# Patient Record
Sex: FEMALE | Race: WHITE | NOT HISPANIC OR LATINO | Employment: OTHER | URBAN - METROPOLITAN AREA
[De-identification: names, ages, dates, MRNs, and addresses within clinical notes are randomized per-mention and may not be internally consistent; named-entity substitution may affect disease eponyms.]

---

## 2024-06-04 ENCOUNTER — HOSPITAL ENCOUNTER (OUTPATIENT)
Dept: RADIOLOGY | Facility: CLINIC | Age: 89
Discharge: HOME | End: 2024-06-04
Payer: MEDICARE

## 2024-06-04 DIAGNOSIS — J45.30 MILD PERSISTENT ASTHMA, UNCOMPLICATED (HHS-HCC): ICD-10-CM

## 2024-06-04 PROCEDURE — 71046 X-RAY EXAM CHEST 2 VIEWS: CPT | Performed by: RADIOLOGY

## 2024-06-04 PROCEDURE — 71046 X-RAY EXAM CHEST 2 VIEWS: CPT

## 2025-03-31 ENCOUNTER — APPOINTMENT (OUTPATIENT)
Dept: RADIOLOGY | Facility: HOSPITAL | Age: OVER 89
End: 2025-03-31
Payer: MEDICARE

## 2025-03-31 ENCOUNTER — HOSPITAL ENCOUNTER (EMERGENCY)
Facility: HOSPITAL | Age: OVER 89
Discharge: HOME | End: 2025-03-31
Attending: EMERGENCY MEDICINE
Payer: MEDICARE

## 2025-03-31 VITALS
DIASTOLIC BLOOD PRESSURE: 87 MMHG | BODY MASS INDEX: 34.36 KG/M2 | OXYGEN SATURATION: 98 % | SYSTOLIC BLOOD PRESSURE: 195 MMHG | HEART RATE: 68 BPM | RESPIRATION RATE: 18 BRPM | TEMPERATURE: 96.8 F | HEIGHT: 60 IN | WEIGHT: 175 LBS

## 2025-03-31 DIAGNOSIS — S52.591A OTHER CLOSED FRACTURE OF DISTAL END OF RIGHT RADIUS, INITIAL ENCOUNTER: Primary | ICD-10-CM

## 2025-03-31 PROCEDURE — 73110 X-RAY EXAM OF WRIST: CPT | Mod: RIGHT SIDE | Performed by: RADIOLOGY

## 2025-03-31 PROCEDURE — 99284 EMERGENCY DEPT VISIT MOD MDM: CPT | Performed by: EMERGENCY MEDICINE

## 2025-03-31 PROCEDURE — 73130 X-RAY EXAM OF HAND: CPT | Mod: RIGHT SIDE | Performed by: RADIOLOGY

## 2025-03-31 PROCEDURE — 73110 X-RAY EXAM OF WRIST: CPT | Mod: RT

## 2025-03-31 PROCEDURE — 73590 X-RAY EXAM OF LOWER LEG: CPT | Mod: RT

## 2025-03-31 PROCEDURE — 2500000001 HC RX 250 WO HCPCS SELF ADMINISTERED DRUGS (ALT 637 FOR MEDICARE OP)

## 2025-03-31 PROCEDURE — 73130 X-RAY EXAM OF HAND: CPT | Mod: RT

## 2025-03-31 PROCEDURE — 2500000005 HC RX 250 GENERAL PHARMACY W/O HCPCS

## 2025-03-31 PROCEDURE — 25605 CLTX DST RDL FX/EPHYS SEP W/: CPT

## 2025-03-31 PROCEDURE — 2500000004 HC RX 250 GENERAL PHARMACY W/ HCPCS (ALT 636 FOR OP/ED)

## 2025-03-31 PROCEDURE — 73590 X-RAY EXAM OF LOWER LEG: CPT | Mod: RIGHT SIDE | Performed by: RADIOLOGY

## 2025-03-31 RX ORDER — IBUPROFEN 400 MG/1
400 TABLET ORAL ONCE
Status: COMPLETED | OUTPATIENT
Start: 2025-03-31 | End: 2025-03-31

## 2025-03-31 RX ORDER — BUPIVACAINE HYDROCHLORIDE 5 MG/ML
20 INJECTION, SOLUTION EPIDURAL; INTRACAUDAL; PERINEURAL ONCE
Status: COMPLETED | OUTPATIENT
Start: 2025-03-31 | End: 2025-03-31

## 2025-03-31 RX ORDER — LIDOCAINE HYDROCHLORIDE 10 MG/ML
20 INJECTION, SOLUTION INFILTRATION; PERINEURAL ONCE
Status: COMPLETED | OUTPATIENT
Start: 2025-03-31 | End: 2025-03-31

## 2025-03-31 RX ADMIN — LIDOCAINE HYDROCHLORIDE 20 ML: 10 INJECTION, SOLUTION INFILTRATION; PERINEURAL at 17:40

## 2025-03-31 RX ADMIN — IBUPROFEN 400 MG: 400 TABLET, FILM COATED ORAL at 17:21

## 2025-03-31 RX ADMIN — Medication 1 APPLICATION: at 17:40

## 2025-03-31 RX ADMIN — BUPIVACAINE HYDROCHLORIDE 100 MG: 5 INJECTION, SOLUTION EPIDURAL; INTRACAUDAL; PERINEURAL at 17:40

## 2025-03-31 ASSESSMENT — PAIN SCALES - GENERAL
PAINLEVEL_OUTOF10: 3
PAINLEVEL_OUTOF10: 8
PAINLEVEL_OUTOF10: 7

## 2025-03-31 ASSESSMENT — COLUMBIA-SUICIDE SEVERITY RATING SCALE - C-SSRS
6. HAVE YOU EVER DONE ANYTHING, STARTED TO DO ANYTHING, OR PREPARED TO DO ANYTHING TO END YOUR LIFE?: NO
1. IN THE PAST MONTH, HAVE YOU WISHED YOU WERE DEAD OR WISHED YOU COULD GO TO SLEEP AND NOT WAKE UP?: NO
2. HAVE YOU ACTUALLY HAD ANY THOUGHTS OF KILLING YOURSELF?: NO

## 2025-03-31 ASSESSMENT — LIFESTYLE VARIABLES
TOTAL SCORE: 0
HAVE PEOPLE ANNOYED YOU BY CRITICIZING YOUR DRINKING: NO
EVER FELT BAD OR GUILTY ABOUT YOUR DRINKING: NO
HAVE YOU EVER FELT YOU SHOULD CUT DOWN ON YOUR DRINKING: NO
EVER HAD A DRINK FIRST THING IN THE MORNING TO STEADY YOUR NERVES TO GET RID OF A HANGOVER: NO

## 2025-03-31 ASSESSMENT — PAIN - FUNCTIONAL ASSESSMENT: PAIN_FUNCTIONAL_ASSESSMENT: 0-10

## 2025-03-31 ASSESSMENT — PAIN DESCRIPTION - LOCATION
LOCATION: WRIST
LOCATION: WRIST

## 2025-03-31 ASSESSMENT — PAIN DESCRIPTION - PAIN TYPE: TYPE: ACUTE PAIN

## 2025-03-31 ASSESSMENT — PAIN DESCRIPTION - ORIENTATION: ORIENTATION: RIGHT

## 2025-03-31 NOTE — ED PROVIDER NOTES
HPI   Chief Complaint   Patient presents with    Fall    Wrist Injury     Pt states slipped on grass and injured r wrist        Chiara Garces is a 89 year old female with PMH of diabetes and HTN presenting to the ED with right wrist pain after fall. Patient states that she was at home taking the garbage in when she experienced a mechanical fall after turning around on the grass. She states that she was able to brace her fall with her right hand and then land onto her bottom.  Denies presyncopal symptoms, chest pain, shortness of breath.  Denies head injury, LOC, anticoagulation use.  Main complaint is right wrist pain.  Her daughter was able to bring her in and supply her with an ice pack to minimize swelling. Patient states that she is able to move all of her fingers but it causes pain.  Denies any numbness or tingling.  Denies any other acute injuries, back pain, neck pain              Patient History   Past Medical History:   Diagnosis Date    Diabetes mellitus (Multi)     Disease of thyroid gland     Hypertension      Past Surgical History:   Procedure Laterality Date    CHOLECYSTECTOMY      JOINT REPLACEMENT       No family history on file.  Social History     Tobacco Use    Smoking status: Never    Smokeless tobacco: Not on file   Substance Use Topics    Alcohol use: Never    Drug use: Never       Physical Exam   ED Triage Vitals   Temperature Pulse Respirations BP   03/31/25 1624 -- 03/31/25 1624 03/31/25 1630   36.5 °C (97.7 °F)  17 133/64      Pulse Ox Temp Source Heart Rate Source Patient Position   03/31/25 1624 03/31/25 1624 03/31/25 1624 03/31/25 1624   98 % Temporal Monitor Sitting      BP Location FiO2 (%)     03/31/25 1624 --     Left arm        Physical Exam  Constitutional:       Appearance: Normal appearance.   HENT:      Head: Normocephalic and atraumatic.   Musculoskeletal:      Comments: No midline C, T, L-spine tenderness    Right wrist with obvious deformity, no open wounds, able to flex and  extend her digits without difficulty.  Gross swelling noted along the radial aspect of her wrist.  Pulses intact, cap refill less than 2 seconds, sensation intact   Neurological:      General: No focal deficit present.      Mental Status: She is alert and oriented to person, place, and time.   Psychiatric:         Mood and Affect: Mood normal.         Behavior: Behavior normal.           ED Course & MDM   ED Course as of 03/31/25 2005   Mon Mar 31, 2025   1747 XR hand right 3+ views  Comminuted distal radial fracture with moderate impaction and mild  displacement. Questionable ulnar styloid fracture. Soft tissue edema   [ML]   1914 XR wrist right 3+ views  1. Distal right radial metaphyseal fracture, as above.   [ML]      ED Course User Index  [ML] Deborah Whaley PA-C         Diagnoses as of 03/31/25 2005   Other closed fracture of distal end of right radius, initial encounter                 No data recorded     Raheem Coma Scale Score: 15 (03/31/25 1632 : Mayra Amado RN)                           Medical Decision Making  89-year-old female presenting today for mechanical fall.  Denies any syncope or presyncopal symptoms causing the fall.  No head injury, LOC, anticoagulation use.  On exam, no midline C, T, L-spine tenderness. Right wrist with obvious deformity, no open wounds, able to flex and extend her digits without difficulty.  Gross swelling noted along the radial aspect of her wrist.  Pulses intact, cap refill less than 2 seconds, sensation intact.  X-rays obtained of right wrist and hand.    X-ray is revealing a distal radial fracture with moderate impaction and displacement.  Attempted hematoma block of patient which patient tolerated well.  Placed it in a sugar-tong splint.  Repeat imaging showing similar appearing fracture.  No new fracture or dislocation identified.  Repeat x-ray visualized by me and ED attending, appears mildly better.  Discussed close follow-up with orthopedic surgery.   Patient declined any prescriptions for pain management as she has ibuprofen and Tylenol at home.  Has already scheduled appointment with orthopedic surgery tomorrow for evaluation.        Procedure  Orthopaedic Injury Treatment - Fracture    Performed by: Deborah Whaley PA-C  Authorized by: Bijan Marie DO    Consent:     Consent obtained:  Verbal and written    Consent given by:  Patient    Risks, benefits, and alternatives were discussed: yes      Risks discussed:  Nerve damage, pain and vascular damage    Alternatives discussed:  No treatment, delayed treatment and alternative treatment  Universal protocol:     Procedure explained and questions answered to patient or proxy's satisfaction: yes      Relevant documents present and verified: yes      Imaging studies available: yes      Immediately prior to procedure, a time out was called: yes      Patient identity confirmed:  Verbally with patient  Injury:     Injury location:  Forearm    Forearm injury location:  R forearm    Forearm fracture type: distal radial    Pre-procedure details:     Distal neurologic exam:  Normal    Distal perfusion: distal pulses strong      Range of motion: normal    Sedation:     Sedation type:  None  Procedure details:     Manipulation performed: yes      Immobilization:  Splint    Splint type:  Sugar tong  Post-procedure details:     Distal neurologic exam:  Normal    Distal perfusion: distal pulses strong      Procedure completion:  Tolerated       Deborah Whaley PA-C  03/31/25 2016

## 2025-03-31 NOTE — DISCHARGE INSTRUCTIONS
Follow-up with orthopedic surgery  If your fingertips get numb or blue, please take off the splint and come to the ER immediately  Do not get the splint wet, if you need to shower, please put in a plastic bag.    Take Tylenol or ibuprofen as needed for pain.  If you feel like you are pain is not tolerated with these medications, please discuss with orthopedic surgery with a medication stronger

## 2025-04-01 ENCOUNTER — OFFICE VISIT (OUTPATIENT)
Dept: ORTHOPEDIC SURGERY | Facility: CLINIC | Age: OVER 89
End: 2025-04-01
Payer: MEDICARE

## 2025-04-01 ENCOUNTER — HOSPITAL ENCOUNTER (OUTPATIENT)
Dept: RADIOLOGY | Facility: CLINIC | Age: OVER 89
Discharge: HOME | End: 2025-04-01
Payer: MEDICARE

## 2025-04-01 DIAGNOSIS — S52.591A OTHER CLOSED FRACTURE OF DISTAL END OF RIGHT RADIUS, INITIAL ENCOUNTER: ICD-10-CM

## 2025-04-01 PROCEDURE — 25600 CLTX DST RDL FX/EPHYS SEP WO: CPT | Performed by: ORTHOPAEDIC SURGERY

## 2025-04-01 PROCEDURE — 73110 X-RAY EXAM OF WRIST: CPT | Mod: RIGHT SIDE | Performed by: ORTHOPAEDIC SURGERY

## 2025-04-01 PROCEDURE — 73110 X-RAY EXAM OF WRIST: CPT | Mod: RT

## 2025-04-01 PROCEDURE — 1157F ADVNC CARE PLAN IN RCRD: CPT | Performed by: ORTHOPAEDIC SURGERY

## 2025-04-01 PROCEDURE — 99214 OFFICE O/P EST MOD 30 MIN: CPT | Mod: 25 | Performed by: ORTHOPAEDIC SURGERY

## 2025-04-01 PROCEDURE — 99204 OFFICE O/P NEW MOD 45 MIN: CPT | Performed by: ORTHOPAEDIC SURGERY

## 2025-04-01 NOTE — PROGRESS NOTES
History present illness: Patient presents today status post mechanical fall injuring the right wrist yesterday.  She was seen in the emergency department at [facility].  She was splinted and told to follow-up with orthopedics.  She presents today for evaluation and treatment.      Past medical history: The patient's past medical history, family history, social history, and review of systems were documented on the patient medical intake.  The updated data was reviewed in the electronic medical record.  History is negative except otherwise stated in history of present illness.        Physical examination:  General: Alert and oriented to person, place, and time.  No acute distress and breathing comfortably: Pleasant and cooperative with examination.  HEENT: Head is normocephalic and atraumatic.  Neck: Supple, no visible swelling.  Cardiovascular: No palpable tachycardia  Lungs: No audible wheezing or labored breathing  Abdomen: Nondistended.  Extremities: Evaluation of the right upper extremity finds the patient had palpable radial artery at the wrist with brisk capillary refill to all digits.  Patient has intact sensation to axillary radial median and ulnar nerves.  There are no open wounds.  There are no signs of infection.  There is no evidence of lymphedema or lymphatic streaking.  The patient has supple compartments to right arm forearm and hand.  Tenderness swelling ecchymosis notable to right wrist.      Radiology: Comminuted displaced intra-articular fracture right distal radius.      Assessment: Comminuted displaced intra-articular fracture right distal radius      Plan: Treatment options were discussed.  We talked about operative and nonoperative strategies.  Patient elects to proceed forth with long-arm cast immobilization strict nonweightbearing ice elevation in 1 week follow-up for x-rays in the cast.  Fiberglass casting material was used to create well-padded well molded right long-arm cast.  Cast care  instructions were given.        Procedure:

## 2025-04-08 ENCOUNTER — HOSPITAL ENCOUNTER (OUTPATIENT)
Dept: RADIOLOGY | Facility: CLINIC | Age: OVER 89
Discharge: HOME | End: 2025-04-08
Payer: MEDICARE

## 2025-04-08 ENCOUNTER — OFFICE VISIT (OUTPATIENT)
Dept: ORTHOPEDIC SURGERY | Facility: CLINIC | Age: OVER 89
End: 2025-04-08
Payer: MEDICARE

## 2025-04-08 DIAGNOSIS — S52.591A OTHER CLOSED FRACTURE OF DISTAL END OF RIGHT RADIUS, INITIAL ENCOUNTER: Primary | ICD-10-CM

## 2025-04-08 DIAGNOSIS — S52.591A OTHER CLOSED FRACTURE OF DISTAL END OF RIGHT RADIUS, INITIAL ENCOUNTER: ICD-10-CM

## 2025-04-08 PROCEDURE — 1159F MED LIST DOCD IN RCRD: CPT | Performed by: ORTHOPAEDIC SURGERY

## 2025-04-08 PROCEDURE — 73110 X-RAY EXAM OF WRIST: CPT | Mod: RIGHT SIDE | Performed by: ORTHOPAEDIC SURGERY

## 2025-04-08 PROCEDURE — 99024 POSTOP FOLLOW-UP VISIT: CPT | Performed by: ORTHOPAEDIC SURGERY

## 2025-04-08 PROCEDURE — 99211 OFF/OP EST MAY X REQ PHY/QHP: CPT | Performed by: ORTHOPAEDIC SURGERY

## 2025-04-08 PROCEDURE — 1157F ADVNC CARE PLAN IN RCRD: CPT | Performed by: ORTHOPAEDIC SURGERY

## 2025-04-08 PROCEDURE — 73110 X-RAY EXAM OF WRIST: CPT | Mod: RT

## 2025-04-08 PROCEDURE — 1036F TOBACCO NON-USER: CPT | Performed by: ORTHOPAEDIC SURGERY

## 2025-04-08 RX ORDER — SITAGLIPTIN 100 MG/1
100 TABLET, FILM COATED ORAL
COMMUNITY

## 2025-04-08 RX ORDER — GLIPIZIDE AND METFORMIN HCL 2.5; 5 MG/1; MG/1
1 TABLET, FILM COATED ORAL
COMMUNITY

## 2025-04-08 RX ORDER — LOSARTAN POTASSIUM 50 MG/1
50 TABLET ORAL DAILY
COMMUNITY

## 2025-04-08 RX ORDER — PANTOPRAZOLE SODIUM 40 MG/1
40 TABLET, DELAYED RELEASE ORAL 2 TIMES DAILY
COMMUNITY
Start: 2024-02-05

## 2025-04-08 RX ORDER — LEVOTHYROXINE SODIUM 125 UG/1
125 TABLET ORAL DAILY
COMMUNITY

## 2025-04-08 RX ORDER — EMPAGLIFLOZIN 25 MG/1
1 TABLET, FILM COATED ORAL
COMMUNITY
Start: 2024-09-17

## 2025-04-08 RX ORDER — AMOXICILLIN 875 MG/1
875 TABLET, FILM COATED ORAL 2 TIMES DAILY
COMMUNITY

## 2025-04-08 RX ORDER — ALPRAZOLAM 0.25 MG/1
0.25 TABLET ORAL 2 TIMES DAILY PRN
COMMUNITY
Start: 2025-03-20

## 2025-04-08 RX ORDER — DILTIAZEM HYDROCHLORIDE 60 MG/1
TABLET, FILM COATED ORAL
COMMUNITY
Start: 2024-06-04

## 2025-04-08 NOTE — PROGRESS NOTES
Patient presents today for ongoing care of her comminuted intra-articular displaced right distal radius fracture.  She has been compliant with weightbearing restrictions.  She describes numbness and tingling to the median nerve distribution to the right hand.  On physical exam her swelling to the digits along with active motion is improved.  X-rays demonstrate the fracture described above without interval displacement.  Recommendations were made for 3 additional weeks of the current long-arm cast.  Patient is agreeable.  Persist with nonweightbearing.  Follow-up in 3 weeks for x-rays of right wrist out of cast.  Convert to short arm cast versus removable Velcro wrist splint at that time.

## 2025-04-29 ENCOUNTER — HOSPITAL ENCOUNTER (OUTPATIENT)
Dept: RADIOLOGY | Facility: CLINIC | Age: OVER 89
Discharge: HOME | End: 2025-04-29
Payer: MEDICARE

## 2025-04-29 ENCOUNTER — OFFICE VISIT (OUTPATIENT)
Dept: ORTHOPEDIC SURGERY | Facility: CLINIC | Age: OVER 89
End: 2025-04-29
Payer: MEDICARE

## 2025-04-29 DIAGNOSIS — S52.591A OTHER CLOSED FRACTURE OF DISTAL END OF RIGHT RADIUS, INITIAL ENCOUNTER: ICD-10-CM

## 2025-04-29 PROCEDURE — 29085 APPL CAST HAND&LWR FOREARM: CPT | Performed by: ORTHOPAEDIC SURGERY

## 2025-04-29 PROCEDURE — 99024 POSTOP FOLLOW-UP VISIT: CPT | Performed by: ORTHOPAEDIC SURGERY

## 2025-04-29 PROCEDURE — 73110 X-RAY EXAM OF WRIST: CPT | Mod: RT

## 2025-04-29 PROCEDURE — 99213 OFFICE O/P EST LOW 20 MIN: CPT | Mod: 25 | Performed by: ORTHOPAEDIC SURGERY

## 2025-04-29 PROCEDURE — 1157F ADVNC CARE PLAN IN RCRD: CPT | Performed by: ORTHOPAEDIC SURGERY

## 2025-04-29 NOTE — PROGRESS NOTES
4/29/2025    Chief Complaint   Patient presents with    Right Wrist - Follow-up     Distal radius fx  DOI: 3/31/25  Xrays today        History of Present Illness:  Patient Chiara Garces , 89 y.o. female, presents today, 4/29/2025, for evaluation of right wrist  distal radius fracture, 4 weeks out from nonoperative management.  She has been immobilized in long-arm cast.  She states she has been largely compliant with nonweightbearing restrictions but has had to use the hand for some light activities if she lives alone. .         Review of Systems:   GENERAL: Negative  GI: Negative  MUSCULOSKELETAL: See HPI  SKIN: Negative  NEURO:  Negative     Physical Exam:  GENERAL:  Alert and oriented to person, place, and time.  No acute distress and breathing comfortably; pleasant and cooperative with the examination.  HEENT:  Head is normocephalic and atraumatic.  NECK:  Supple, no visible swelling.  CARDIOVASCULAR:  No palpable tachycardia.  LUNGS:  No audible wheezing or labored breathing.  ABDOMEN:  Nondistended.  Extremities: Evaluation of the right upper extremity finds the patient to have a palpable radial artery at the wrist with brisk capillary refill to all digits. The patient has intact sensorium to axillary, radial, median and ulnar nerves. There are no open wounds. There are no signs of infection. There is no evidence of lymphedema or lymphatic streaking. The patient has supple compartments of the right arm, forearm and hand.  Tender palpation over distal radius fracture site.  Mild swelling of the digits but overall good range of motion with flexion extension maintained.  She has pain with flexion extension arc across the wrist.  She can comfortably extend the elbow lacking about 15 degrees but comfortably flex about 125 degrees.     Imaging/Test Results:  3 views the right wrist show evidence of displaced right distal radius fracture with continued adequate alignment throughout all 3 planes.  Some early  healing and increased callus formation is noted.     Assessment:  Right distal radius fracture, 4 weeks out nonoperative management.     Plan:  Continue with nonweightbearing restrictions of the right upper extremity.  We discussed transitioning to short arm cast today for additional support.  Patient was amenable to this.  Follow-up in 3 weeks for repeat clinical and radiographic exam, x-rays 3 views of right wrist out of cast at next visit.  All questions answered today's visit.    In a face to face encounter, I performed a history and physical examination, discussed pertinent diagnostic studies if indicated, and discussed diagnosis and management strategies with both the patient and the mid-level provider. I reviewed the mid-level's note and agree with the documented findings and plan of care.  Patient presents today for evaluation of right distal radius fracture.  X-rays demonstrate partial bony bridging.  Substantial shortening and loss of radial height and radial inclination.  Continued tenderness over the fracture.  Recommendations were made for application of short arm cast.  Fiberglass casting material was used to fabricate well-padded well molded right short arm cast.  3-week follow-up for x-rays of right wrist out of cast.  Patient is agreeable with this strategy.

## 2025-05-20 ENCOUNTER — HOSPITAL ENCOUNTER (OUTPATIENT)
Dept: RADIOLOGY | Facility: CLINIC | Age: OVER 89
Discharge: HOME | End: 2025-05-20
Payer: MEDICARE

## 2025-05-20 ENCOUNTER — OFFICE VISIT (OUTPATIENT)
Dept: ORTHOPEDIC SURGERY | Facility: CLINIC | Age: OVER 89
End: 2025-05-20
Payer: MEDICARE

## 2025-05-20 DIAGNOSIS — S52.591A OTHER CLOSED FRACTURE OF DISTAL END OF RIGHT RADIUS, INITIAL ENCOUNTER: ICD-10-CM

## 2025-05-20 DIAGNOSIS — S52.591A OTHER CLOSED FRACTURE OF DISTAL END OF RIGHT RADIUS, INITIAL ENCOUNTER: Primary | ICD-10-CM

## 2025-05-20 PROCEDURE — 73110 X-RAY EXAM OF WRIST: CPT | Mod: RT

## 2025-05-20 PROCEDURE — 73110 X-RAY EXAM OF WRIST: CPT | Mod: RIGHT SIDE | Performed by: ORTHOPAEDIC SURGERY

## 2025-05-20 PROCEDURE — 99024 POSTOP FOLLOW-UP VISIT: CPT | Performed by: ORTHOPAEDIC SURGERY

## 2025-05-20 PROCEDURE — L3908 WHO COCK-UP NONMOLDE PRE OTS: HCPCS | Performed by: ORTHOPAEDIC SURGERY

## 2025-05-20 PROCEDURE — 99213 OFFICE O/P EST LOW 20 MIN: CPT | Performed by: ORTHOPAEDIC SURGERY

## 2025-05-20 NOTE — PROGRESS NOTES
5/20/2025    Chief Complaint   Patient presents with    Right Wrist - Follow-up     Distal radius fx  DOI: 3/31/25  Xrays today XOP         History of Present Illness:  Patient Chiara Garces , 89 y.o. female, presents today, 5/20/2025, for evaluation of right wrist distal radius fracture, 7 weeks out nonoperative management.  Patient comes in today with her son helps provide from history.  She has been attempting to avoid weightbearing and activities with the upper extremity is much as possible.  We transition to short arm cast at last visit and they are inquiring about moving to a removable brace.  Overall she feels pain and discomfort are decreasing and functionality is increasing.         Review of Systems:   GENERAL: Negative  GI: Negative  MUSCULOSKELETAL: See HPI  SKIN: Negative  NEURO:  Negative     Physical Exam:  GENERAL:  Alert and oriented to person, place, and time.  No acute distress and breathing comfortably; pleasant and cooperative with the examination.  HEENT:  Head is normocephalic and atraumatic.  NECK:  Supple, no visible swelling.  CARDIOVASCULAR:  No palpable tachycardia.  LUNGS:  No audible wheezing or labored breathing.  ABDOMEN:  Nondistended.  Extremities: Evaluation of the right upper extremity finds the patient to have a palpable radial artery at the wrist with brisk capillary refill to all digits. The patient has intact sensorium to axillary, radial, median and ulnar nerves. There are no open wounds. There are no signs of infection. There is no evidence of lymphedema or lymphatic streaking. The patient has supple compartments of the right arm, forearm and hand.  No tenderness palpation of the fracture site today.  She demonstrates appropriate range of motion flexion extension of the digits with full composite fist.     Imaging/Test Results:  3 views of the right wrist taken in the office today show evidence of distal radius fracture with malunion.  Increased healing callus  formation is noted.  Continued good alignment throughout all 3 planes.     Assessment:  Right distal radius fracture, 7 weeks out nonoperative management.     Plan:  Recommend transition to splint for comfort.  She can wear this over the next couple weeks and then wean from it.  She can begin slowly progressing to weightbearing activities as tolerated did.  Follow-up with our office in 6 weeks if she has any lingering concerns, x-rays upon return 3 views of the right wrist.  If symptoms resolve in the interim she may call to cancel.  All questions answered today's visit.    Caro Hodge PA-C

## 2025-07-01 ENCOUNTER — OFFICE VISIT (OUTPATIENT)
Dept: ORTHOPEDIC SURGERY | Facility: CLINIC | Age: OVER 89
End: 2025-07-01
Payer: MEDICARE

## 2025-07-01 ENCOUNTER — HOSPITAL ENCOUNTER (OUTPATIENT)
Dept: RADIOLOGY | Facility: CLINIC | Age: OVER 89
Discharge: HOME | End: 2025-07-01
Payer: MEDICARE

## 2025-07-01 DIAGNOSIS — S52.591A OTHER CLOSED FRACTURE OF DISTAL END OF RIGHT RADIUS, INITIAL ENCOUNTER: ICD-10-CM

## 2025-07-01 PROCEDURE — 73110 X-RAY EXAM OF WRIST: CPT | Mod: RT

## 2025-07-01 PROCEDURE — 1036F TOBACCO NON-USER: CPT | Performed by: ORTHOPAEDIC SURGERY

## 2025-07-01 PROCEDURE — 99213 OFFICE O/P EST LOW 20 MIN: CPT | Performed by: ORTHOPAEDIC SURGERY

## 2025-07-01 PROCEDURE — 99212 OFFICE O/P EST SF 10 MIN: CPT | Performed by: ORTHOPAEDIC SURGERY

## 2025-07-01 PROCEDURE — 1159F MED LIST DOCD IN RCRD: CPT | Performed by: ORTHOPAEDIC SURGERY

## 2025-07-01 NOTE — PROGRESS NOTES
7/1/2025    Chief Complaint   Patient presents with    Right Wrist - Follow-up     Distal radius fx  DOI: 3/31/25  Xrays today         History of Present Illness:  Patient Chiara Garces , 89 y.o. female, presents today, 7/1/2025, for evaluation of right wrist distal radius fracture, 3 months out from nonoperative management.  Here today with her son who helps providing from history.  She is still been wearing the splint.  She states she is transitioning away from it at home more to Ace bandage.  She continues to have persistence of ulnar-sided wrist pain.  Overall she feels that the hand is sore and weak and she cannot fully grasp and squeeze things like she could before.  No new injury or trauma reported.         Review of Systems:   GENERAL: Negative  GI: Negative  MUSCULOSKELETAL: See HPI  SKIN: Negative  NEURO:  Negative     Physical Exam:  GENERAL:  Alert and oriented to person, place, and time.  No acute distress and breathing comfortably; pleasant and cooperative with the examination.  HEENT:  Head is normocephalic and atraumatic.  NECK:  Supple, no visible swelling.  CARDIOVASCULAR:  No palpable tachycardia.  LUNGS:  No audible wheezing or labored breathing.  ABDOMEN:  Nondistended.  Extremities: Evaluation of the right upper extremity finds the patient to have a palpable radial artery at the wrist with brisk capillary refill to all digits. The patient has intact sensorium to axillary, radial, median and ulnar nerves. There are no open wounds. There are no signs of infection. There is no evidence of lymphedema or lymphatic streaking. The patient has supple compartments of the right arm, forearm and hand.  She is nontender to palpation of the fracture site of the right distal radius.  She does have tenderness diffusely over the ulnar aspect of the wrist and the DRUJ.  There is no 1 location of discrete tenderness however.  She has functional arc of motion with flexion extension and pronosupination of  the wrist.     Imaging/Test Results:  Radiographs show evidence of healed distal radius fracture with continued adequate alignment.     Assessment:  Persistent right wrist pain 3 months out from nonoperative management of healed right distal radius fracture with concern for continued discomfort secondary to ulnar-sided wrist pain.     Plan:  Treatment options were reviewed.  We recommended formal therapy referral to work on endurance and strengthening.  She declines this.  She wants to continue with home program.  We recommend she wean from immobilization and bracing.  We discussed the phenomenon of ulnar-sided wrist pain after distal radius fracture.  We discussed that we could consider injection at some point if symptoms persist but for now we recommend simple observation and follow-up again in 3 months for repeat clinical exam, if symptoms persist we will get x-rays 3 views of the right wrist upon return.  All questions answered today's visit.    In a face to face encounter, I performed a history and physical examination, discussed pertinent diagnostic studies if indicated, and discussed diagnosis and management strategies with both the patient and the mid-level provider. I reviewed the mid-level's note and agree with the documented findings and plan of care.  Patient presents today for ongoing evaluation of the right wrist.  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  She describes ongoing tenderness about the ulnar wrist.  She is 89.  We talked about this on the front end and she conceded that she is okay with that healing in a suboptimal position to avoid the risks of surgery.  I do believe that given the position of healing she is got a chance to be pain-free over time.  Recommendations were made for weaning from the splint and activities to tolerance.  3-month follow-up with me.  If still painful x-rays of right wrist upon return.  We did talk about  surgical techniques including resection of the right distal ulna if persistent ulnar-sided wrist pain occurs.

## 2025-10-02 ENCOUNTER — APPOINTMENT (OUTPATIENT)
Dept: ORTHOPEDIC SURGERY | Facility: CLINIC | Age: OVER 89
End: 2025-10-02
Payer: MEDICARE